# Patient Record
Sex: MALE | Race: WHITE | NOT HISPANIC OR LATINO | URBAN - NONMETROPOLITAN AREA
[De-identification: names, ages, dates, MRNs, and addresses within clinical notes are randomized per-mention and may not be internally consistent; named-entity substitution may affect disease eponyms.]

---

## 2019-09-19 ENCOUNTER — IMPORTED ENCOUNTER (OUTPATIENT)
Dept: URBAN - NONMETROPOLITAN AREA CLINIC 1 | Facility: CLINIC | Age: 74
End: 2019-09-19

## 2019-09-19 PROBLEM — H00.11: Noted: 2019-09-19

## 2019-09-19 PROCEDURE — 99203 OFFICE O/P NEW LOW 30 MIN: CPT

## 2019-09-19 NOTE — PATIENT DISCUSSION
Chalazion RUL with purulent discharge- Recommend patient begin Augmentin 500/125 mg 1 PO BID for 10 days- Recommend warm compresses BID- Recommend follow-up 5-6 days.

## 2019-09-24 ENCOUNTER — IMPORTED ENCOUNTER (OUTPATIENT)
Dept: URBAN - NONMETROPOLITAN AREA CLINIC 1 | Facility: CLINIC | Age: 74
End: 2019-09-24

## 2019-09-24 PROCEDURE — 99212 OFFICE O/P EST SF 10 MIN: CPT

## 2019-09-24 NOTE — PATIENT DISCUSSION
Resolved Chalazion RUL- Recommend patient finish Augmentin 500/125 mg 1 PO BID- Recommend warm compresses BID- Recommend follow-up PRN

## 2022-04-09 ASSESSMENT — VISUAL ACUITY
OD_CC: 20/400
OS_PH: 20/50-2
OS_CC: 20/50-1
OS_CC: 20/50-2
OS_PH: 20/50
OD_CC: 20/80-1